# Patient Record
Sex: FEMALE | Race: WHITE | NOT HISPANIC OR LATINO | Employment: OTHER | URBAN - METROPOLITAN AREA
[De-identification: names, ages, dates, MRNs, and addresses within clinical notes are randomized per-mention and may not be internally consistent; named-entity substitution may affect disease eponyms.]

---

## 2024-04-18 ASSESSMENT — DERMATOLOGY QUALITY OF LIFE (QOL) ASSESSMENT
RATE HOW EMOTIONALLY BOTHERED YOU ARE BY YOUR SKIN PROBLEM (FOR EXAMPLE, WORRY, EMBARRASSMENT, FRUSTRATION): 3
RATE HOW BOTHERED YOU ARE BY EFFECTS OF YOUR SKIN PROBLEMS ON YOUR ACTIVITIES (EG, GOING OUT, ACCOMPLISHING WHAT YOU WANT, WORK ACTIVITIES OR YOUR RELATIONSHIPS WITH OTHERS): 3
WHAT SINGLE SKIN CONDITION LISTED BELOW IS THE PATIENT ANSWERING THE QUALITY-OF-LIFE ASSESSMENT QUESTIONS ABOUT: DERMATITIS
WHAT SINGLE SKIN CONDITION LISTED BELOW IS THE PATIENT ANSWERING THE QUALITY-OF-LIFE ASSESSMENT QUESTIONS ABOUT: DERMATITIS
RATE HOW BOTHERED YOU ARE BY EFFECTS OF YOUR SKIN PROBLEMS ON YOUR ACTIVITIES (EG, GOING OUT, ACCOMPLISHING WHAT YOU WANT, WORK ACTIVITIES OR YOUR RELATIONSHIPS WITH OTHERS): 3
RATE HOW EMOTIONALLY BOTHERED YOU ARE BY YOUR SKIN PROBLEM (FOR EXAMPLE, WORRY, EMBARRASSMENT, FRUSTRATION): 3
RATE HOW BOTHERED YOU ARE BY SYMPTOMS OF YOUR SKIN PROBLEM (EG, ITCHING, STINGING BURNING, HURTING OR SKIN IRRITATION): 5
RATE HOW BOTHERED YOU ARE BY SYMPTOMS OF YOUR SKIN PROBLEM (EG, ITCHING, STINGING BURNING, HURTING OR SKIN IRRITATION): 5

## 2024-04-18 NOTE — PROGRESS NOTES
"Subjective     Britt Santizo is a 68 y.o. female who presents for the following: Rash.  She states she initially developed itching on the back of her neck 6 weeks ago.  She reports she then noticed red, raised, itchy areas on the sides of her neck and her upper chest.  She denies any other areas of involvement currently.  Of note, she reports she got her hair dyed approximately 2 months ago, but she is not sure whether it was a new dye or product or not.  She denies any other new, changing, or concerning skin lesions; no bleeding, itching, or burning lesions.      Review of Systems:  No other skin or systemic complaints other than what is documented elsewhere in the note.    The following portions of the chart were reviewed this encounter and updated as appropriate:       Skin Cancer History  No skin cancer on file.    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 Miguel Mcmillan on 8/17/05 s/p Mohs surgery by Dr. Nina Ly    Family History:    Parent - nonmelanoma skin cancer  No family history of melanoma    Social History:    The patient states she lives in [place] with farms around her house    Allergies:  Codeine, Epinephrine, and Procaine    Current Medications / CAM's:    Current Outpatient Medications:     triamcinolone (Kenalog) 0.025 % cream, Apply twice daily to affected areas of neck (avoid face, groin, body folds) for 2 weeks, Disp: 80 g, Rfl: 0     Objective   Well appearing patient in no apparent distress; mood and affect are within normal limits.    A skin examination was performed including: Face, neck, chest, and scalp.  She declined examination of any other parts of her skin today. All findings within normal limits unless otherwise noted below.    Assessment/Plan   1. Rash and other nonspecific skin eruption  Left Superior " Lateral Neck  Several pink to erythematous, slightly scaly papules and plaques on her left and right lateral neck and upper chest          Pink to erythematous, scaly papules and plaques - neck and chest.  The clinical differential diagnosis for these lesions includes possibly contact dermatitis, possibly allergic contact dermatitis, versus PMLE versus Dermatomyositis versus other connective tissue disease.  The nature of each of these conditions and management options, including possible biopsy for further diagnostic evaluation, were discussed extensively with the patient today.  After discussing the risks and benefits of various management options, the patient expressed understanding and wishes to undergo biopsy today.  Thus, at this time, I recommend punch biopsy of a representative lesion on the patient's left superior lateral neck in the office today for further diagnostic evaluation.  In the meantime, while we await biopsy results, I recommend empiric topical steroid therapy with Triamcinolone 0.025% cream, which the patient was instructed to apply twice daily to the affected areas of the chest and neck (avoid face, groin, body folds) for the next 2 weeks.  The risks, benefits, and side effects of this medication, including possible skin atrophy with its overuse, were discussed.  The patient expressed understanding, is in agreement with this plan, and wishes to proceed with the biopsy today.    Skin biopsy - Left Superior Lateral Neck  Type of biopsy: punch    Informed consent: discussed and consent obtained    Timeout: patient name, date of birth, surgical site, and procedure verified    Procedure prep:  Patient was prepped and draped  Anesthesia: the lesion was anesthetized in a standard fashion    Anesthetic:  1% lidocaine plain local infiltration  Punch size:  3 mm  Suture size:  5-0  Suture type: Prolene (polypropylene)    Suture removal (days):  7  Hemostasis achieved with: suture    Outcome: patient  tolerated procedure well    Post-procedure details: sterile dressing applied and wound care instructions given    Dressing type: bandage and petrolatum      Staff Communication: Dermatology Local Anesthesia: 1 % Lidocaine / Epinephrine - Amount:0.5ml - Left Superior Lateral Neck    triamcinolone (Kenalog) 0.025 % cream - Left Superior Lateral Neck  Apply twice daily to affected areas of neck (avoid face, groin, body folds) for 2 weeks    Specimen 1 - Dermatopathology- DERM LAB  Differential Diagnosis: contact dermatitis v PMLE v DM v other CT disease  Check Margins Yes/No?:    Comments:    Dermpath Lab: Routine Histopathology (formalin-fixed tissue)    2. Actinic keratosis (8)  Mid Supratip of Nose (8)  Scattered on the patient's nose and bilateral cheeks, there are 8 erythematous, gritty, scaly macules     Actinic Keratoses -scattered on nose and bilateral cheeks.  The pre-cancerous nature of these lesions and treatment options were discussed with the patient today.  At this time, I recommend treatment with liquid nitrogen cryotherapy.  The patient expressed understanding, is in agreement with this plan, and wishes to proceed with cryotherapy today.    Destr of lesion - Mid Supratip of Nose  Complexity: simple    Destruction method: cryotherapy    Informed consent: discussed and consent obtained    Lesion destroyed using liquid nitrogen: Yes    Cryotherapy cycles:  1  Outcome: patient tolerated procedure well with no complications    Post-procedure details: wound care instructions given      3. Seborrheic keratosis  Scattered on the patient's face, neck, and chest, there are several tan- to light brown-colored, hyperkeratotic, stuck-on appearing papules of varying size and shape    Seborrheic Keratoses - the benign nature of these lesions was discussed with the patient today and reassurance provided.  No treatment is medically indicated for these lesions at this time.    4. Diffuse photodamage of  skin  Photodistributed  Diffuse photodamage with actinic changes with telangiectasia and mottled pigmentation in sun-exposed areas.    History of melanoma, basal cell carcinoma, and actinic keratoses and photodamage.  The patient was last seen in our office for routine melanoma follow-up and skin exam by Dr. Hansen on 8/10/23, at which time no evidence of recurrence was noted, and she states she already scheduled an annual return visit for routine follow-up and skin exam with Dr. Hansen on 8/15/24.  I emphasized the importance of continuing to perform monthly self-skin and lymph node exams using the ABCDs of monitoring moles, which were reviewed with the patient, as well as the importance of sun avoidance and sun protection with daily sunscreen use.  I recommend she return to our office for routine melanoma follow-up and total body skin exam with Dr. Hansen on 8/15/24 as scheduled, pending the above biopsy result, and the patient was instructed to call our office should the patient notice any new, changing, symptomatic, or otherwise concerning skin lesions before then.  The patient expressed understanding and is in agreement with this plan.

## 2024-04-19 ENCOUNTER — OFFICE VISIT (OUTPATIENT)
Dept: DERMATOLOGY | Facility: CLINIC | Age: 69
End: 2024-04-19
Payer: MEDICARE

## 2024-04-19 ENCOUNTER — TELEPHONE (OUTPATIENT)
Dept: DERMATOLOGY | Facility: CLINIC | Age: 69
End: 2024-04-19

## 2024-04-19 DIAGNOSIS — L82.1 SEBORRHEIC KERATOSIS: ICD-10-CM

## 2024-04-19 DIAGNOSIS — L57.0 ACTINIC KERATOSIS: ICD-10-CM

## 2024-04-19 DIAGNOSIS — L57.8 DIFFUSE PHOTODAMAGE OF SKIN: ICD-10-CM

## 2024-04-19 DIAGNOSIS — R21 RASH AND OTHER NONSPECIFIC SKIN ERUPTION: Primary | ICD-10-CM

## 2024-04-19 PROCEDURE — 11104 PUNCH BX SKIN SINGLE LESION: CPT | Performed by: DERMATOLOGY

## 2024-04-19 PROCEDURE — 17003 DESTRUCT PREMALG LES 2-14: CPT | Performed by: DERMATOLOGY

## 2024-04-19 PROCEDURE — 88305 TISSUE EXAM BY PATHOLOGIST: CPT | Performed by: DERMATOLOGY

## 2024-04-19 PROCEDURE — 88342 IMHCHEM/IMCYTCHM 1ST ANTB: CPT | Performed by: DERMATOLOGY

## 2024-04-19 PROCEDURE — 1159F MED LIST DOCD IN RCRD: CPT | Performed by: DERMATOLOGY

## 2024-04-19 PROCEDURE — 99214 OFFICE O/P EST MOD 30 MIN: CPT | Performed by: DERMATOLOGY

## 2024-04-19 PROCEDURE — 88341 IMHCHEM/IMCYTCHM EA ADD ANTB: CPT | Performed by: DERMATOLOGY

## 2024-04-19 PROCEDURE — 17000 DESTRUCT PREMALG LESION: CPT | Performed by: DERMATOLOGY

## 2024-04-19 RX ORDER — TRIAMCINOLONE ACETONIDE 0.25 MG/G
CREAM TOPICAL
Qty: 80 G | Refills: 0 | Status: SHIPPED | OUTPATIENT
Start: 2024-04-19

## 2024-04-19 ASSESSMENT — DERMATOLOGY PATIENT ASSESSMENT
DO YOU USE SUNSCREEN: OCCASIONALLY
ARE YOU TRYING TO GET PREGNANT: NO
DO YOU HAVE ANY NEW OR CHANGING LESIONS: NO
DO YOU USE A TANNING BED: NO
ARE YOU AN ORGAN TRANSPLANT RECIPIENT: NO
DO YOU HAVE IRREGULAR MENSTRUAL CYCLES: NO
ARE YOU ON BIRTH CONTROL: NO

## 2024-04-19 ASSESSMENT — ITCH NUMERIC RATING SCALE: HOW SEVERE IS YOUR ITCHING?: 6

## 2024-04-19 ASSESSMENT — DERMATOLOGY QUALITY OF LIFE (QOL) ASSESSMENT: ARE THERE EXCLUSIONS OR EXCEPTIONS FOR THE QUALITY OF LIFE ASSESSMENT: NO

## 2024-04-19 NOTE — TELEPHONE ENCOUNTER
Pt left message at 7:56 that she had 6 cows in her driveway , couldn't get out until cows moved she will be late for her 8:00am appt .

## 2024-04-26 ENCOUNTER — OFFICE VISIT (OUTPATIENT)
Dept: DERMATOLOGY | Facility: CLINIC | Age: 69
End: 2024-04-26
Payer: MEDICARE

## 2024-04-26 DIAGNOSIS — L81.4 LENTIGO: ICD-10-CM

## 2024-04-26 DIAGNOSIS — L30.9 HAND ECZEMA: Primary | ICD-10-CM

## 2024-04-26 DIAGNOSIS — R21 RASH AND OTHER NONSPECIFIC SKIN ERUPTION: ICD-10-CM

## 2024-04-26 DIAGNOSIS — L82.1 SEBORRHEIC KERATOSIS: ICD-10-CM

## 2024-04-26 DIAGNOSIS — L57.8 DIFFUSE PHOTODAMAGE OF SKIN: ICD-10-CM

## 2024-04-26 LAB
LAB AP ASR DISCLAIMER: NORMAL
LABORATORY COMMENT REPORT: NORMAL
PATH REPORT.FINAL DX SPEC: NORMAL
PATH REPORT.GROSS SPEC: NORMAL
PATH REPORT.RELEVANT HX SPEC: NORMAL
PATH REPORT.TOTAL CANCER: NORMAL

## 2024-04-26 PROCEDURE — 1159F MED LIST DOCD IN RCRD: CPT | Performed by: DERMATOLOGY

## 2024-04-26 PROCEDURE — 99214 OFFICE O/P EST MOD 30 MIN: CPT | Performed by: DERMATOLOGY

## 2024-04-26 RX ORDER — CYCLOBENZAPRINE HCL 5 MG
5 TABLET ORAL EVERY 8 HOURS PRN
COMMUNITY
Start: 2023-11-20

## 2024-04-26 RX ORDER — HYDROXYCHLOROQUINE SULFATE 200 MG/1
TABLET, FILM COATED ORAL
COMMUNITY

## 2024-04-26 RX ORDER — MELOXICAM 15 MG/1
15 TABLET ORAL DAILY
COMMUNITY
Start: 2023-12-18

## 2024-04-28 NOTE — PROGRESS NOTES
"Subjective     Britt Santizo is a 68 y.o. female who presents for the following: Follow-up.  She states she initially developed itching on the back of her neck 7 weeks ago.  She reports she then noticed red, raised, itchy areas on the sides of her neck and her upper chest.  Of note, she reports she got her hair dyed approximately 2 months ago, but she is not sure whether it was a new dye or product or not.    She was last seen in our office on 4/19/24, at which time punch biopsy of a representative lesion on her left superior lateral neck was performed, but the results are still pending, and she was prescribed Triamcinolone 0.025% cream, which she has used twice daily on her face and neck for the past 1 week with some improvement.  However, she states she has developed new areas in her hairline on both sides as well as on the tops of her hands, and these areas are red, scaly, and very itchy.  She also notes a bump on her left upper cheek, which has been present for several months, but has recently become more flaky and itchy.  It has not changed in any other way, including in size, shape, or color, and it does not hurt or bleed.  She denies any other new, changing, or concerning skin lesions since her last visit; no bleeding, itching, or burning lesions.      Review of Systems:  No other skin or systemic complaints other than what is documented elsewhere in the note.    The following portions of the chart were reviewed this encounter and updated as appropriate:       Skin Cancer History  No skin cancer on file.    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 Miguel Mcmillan on 8/17/05 s/p Mohs surgery by Dr. Nina Ly    Family History:    Parent - nonmelanoma skin cancer  No family history of melanoma    Social History:    The patient states she " lives in [place] with farms around her house    Allergies:  Codeine, Epinephrine, and Procaine    Current Medications / CAM's:    Current Outpatient Medications:     cyclobenzaprine (Flexeril) 5 mg tablet, Take 1 tablet (5 mg) by mouth every 8 hours if needed., Disp: , Rfl:     denosumab (Prolia) 60 mg/mL syringe, Inject 1 mL (60 mg total) under the skin every 6 months., Disp: , Rfl:     hydroxychloroquine (Plaquenil) 200 mg tablet, TAKE 1 TAB DAILY MONDAY-FRIDA AND TAKE 2 TABS DAILY SAT-SUN, Disp: , Rfl:     meloxicam (Mobic) 15 mg tablet, Take 1 tablet (15 mg) by mouth once daily., Disp: , Rfl:     triamcinolone (Kenalog) 0.025 % cream, Apply twice daily to affected areas of neck (avoid face, groin, body folds) for 2 weeks, Disp: 80 g, Rfl: 0     Objective   Well appearing patient in no apparent distress; mood and affect are within normal limits.    A skin examination was performed including: Face, neck, and bilateral upper extremities. All findings within normal limits unless otherwise noted below.    Assessment/Plan   1. Hand eczema  Left Hand - Anterior  On the patient's bilateral dorsal hands, there are several erythematous, scaly, slightly lichenified, thin papules and small plaques    Hand Dermatitis -bilateral dorsal hands.  The potentially chronic and intermittently flaring nature of this condition and treatment options were discussed extensively with the patient today.  At this time, I recommend topical steroid therapy with Triamcinolone 0.1% cream, which the patient was instructed to apply twice daily to the affected areas of her hands (avoid face, groin, body folds) for the next 2 weeks, followed by taper to twice daily on weekends only for persistent areas; the patient may repeat treatment in a 2-week burst-and-taper fashion every 6-8 weeks as needed for future flares.  I also emphasized the importance of dry, sensitive skin care and good hand hygiene, including minimizing the frequency and  duration of hand-washing as much as is safely possible, given the current coronavirus pandemic; using a lukewarm, but not hot water and a mild soap, such as Dove; and frequent and aggressive moisturization, at least twice daily and immediately following hand-washing, with recommended over-the-counter moisturizing creams, such as Eucerin, Cetaphil, Cerave, or Aveeno; Vaseline or Aquaphor ointments; or Neutrogena Norwegian Formula Hand Cream.  The risks, benefits, and side effects of this medication, including possible skin atrophy with overuse of topical steroids, were discussed.  The patient expressed understanding and is in agreement with this plan.    2. Rash and other nonspecific skin eruption  Neck - Anterior  Several pink to slightly erythematous, slightly scaly, thin papules and plaques on her left and right lateral neck and upper chest    Pink to erythematous, scaly papules and plaques - neck and chest with new lesions on left and right temporal hairline.  Based on the patient's history, recent exam, and repeat exam today, the clinical differential diagnosis for these lesions includes possibly contact dermatitis, possibly allergic contact dermatitis, versus PMLE versus Dermatomyositis versus other connective tissue disease.  The nature of each of these conditions and management options were discussed extensively with the patient today.  After discussing the risks and benefits of various management options, the patient expressed understanding and wished to undergo biopsy for further diagnostic evaluation.  Thus, punch biopsy of a representative lesion on the patient's left superior lateral neck was performed at her last visit on 4/19/24, but the results are still pending.  Thus, while we await biopsy results, I recommend she continue empiric topical steroid therapy with Triamcinolone 0.025% cream, which the patient was instructed to apply twice daily to the affected areas of the chest, neck, and bilateral  temporal hairline (avoid face, groin, body folds) for the next 1-2 weeks.  The risks, benefits, and side effects of this medication, including possible skin atrophy with its overuse, were discussed.  We will contact the patient with her biopsy result once it is available.  She expressed understanding and is in agreement with this plan.  Of note, her suture was removed in the office today as well.    Related Medications  triamcinolone (Kenalog) 0.025 % cream  Apply twice daily to affected areas of neck (avoid face, groin, body folds) for 2 weeks    3. Seborrheic keratosis  Head - Anterior (Face)  On her left medial upper cheek, there is a 7 mm tan to light brown-colored, hyperkeratotic, stuck on appearing papule.  Scattered on the patient's face, neck, and bilateral upper extremities, there are multiple tan- to light brown-colored, hyperkeratotic, stuck-on appearing papules of varying size and shape.    Seborrheic Keratoses - the benign nature of these lesions was discussed with the patient today and reassurance provided.  No treatment is medically indicated for these lesions at this time.    4. Lentigo  Photodistributed  Multiple tan- to light brown-colored, round- to oval-shaped, symmetric and uniform-appearing macules and small patches consistent with lentigines scattered in sun-exposed areas.    Solar Lentigines and photodamage.  The clinically benign-appearing nature of these lesions and their relation to chronic sun exposure were discussed with the patient today and reassurance provided.  None of these lesions meet threshold for biopsy today, and thus no treatment is medically indicated for these lesions at this time.  The signs and symptoms of skin cancer were reviewed and the patient was advised to practice sun protection and sun avoidance, use daily sunscreen, and perform regular self skin exams.  The patient was instructed to monitor these lesions for any changes, such as in size, shape, or color, or  associated symptoms and to call our office to schedule a return visit for re-evaluation if any such changes or symptoms are noticed in the future.  The patient expressed understanding and is in agreement with this plan.    5. Diffuse photodamage of skin  Photodistributed  Diffuse photodamage with actinic changes with telangiectasia and mottled pigmentation in sun-exposed areas.    History of melanoma, basal cell carcinoma, and actinic keratoses and photodamage.  The patient was last seen in our office for routine melanoma follow-up and skin exam by Dr. Hansen on 8/10/23, at which time no evidence of recurrence was noted, and she states she already scheduled an annual return visit for routine follow-up and skin exam with Dr. Hansen on 8/15/24.  I emphasized the importance of continuing to perform monthly self-skin and lymph node exams using the ABCDs of monitoring moles, which were reviewed with the patient, as well as the importance of sun avoidance and sun protection with daily sunscreen use.  I recommend she return to our office for routine melanoma follow-up and total body skin exam with Dr. Hansen on 8/15/24 as scheduled, pending the above biopsy result, and the patient was instructed to call our office should the patient notice any new, changing, symptomatic, or otherwise concerning skin lesions before then.  The patient expressed understanding and is in agreement with this plan.

## 2024-05-06 ENCOUNTER — TELEPHONE (OUTPATIENT)
Dept: DERMATOLOGY | Facility: CLINIC | Age: 69
End: 2024-05-06
Payer: MEDICARE

## 2024-05-28 ENCOUNTER — OFFICE VISIT (OUTPATIENT)
Dept: OBSTETRICS AND GYNECOLOGY | Facility: CLINIC | Age: 69
End: 2024-05-28
Payer: MEDICARE

## 2024-05-28 VITALS — WEIGHT: 119 LBS | SYSTOLIC BLOOD PRESSURE: 115 MMHG | DIASTOLIC BLOOD PRESSURE: 68 MMHG | BODY MASS INDEX: 21.08 KG/M2

## 2024-05-28 DIAGNOSIS — N76.2 ACUTE VULVITIS: Primary | ICD-10-CM

## 2024-05-28 DIAGNOSIS — N95.8 GENITOURINARY SYNDROME OF MENOPAUSE: ICD-10-CM

## 2024-05-28 PROCEDURE — 99214 OFFICE O/P EST MOD 30 MIN: CPT | Performed by: OBSTETRICS & GYNECOLOGY

## 2024-05-28 PROCEDURE — 1159F MED LIST DOCD IN RCRD: CPT | Performed by: OBSTETRICS & GYNECOLOGY

## 2024-05-28 PROCEDURE — 1036F TOBACCO NON-USER: CPT | Performed by: OBSTETRICS & GYNECOLOGY

## 2024-05-28 RX ORDER — ESTRADIOL 0.1 MG/G
0.5 CREAM VAGINAL 3 TIMES WEEKLY
Qty: 42.5 G | Refills: 3 | Status: SHIPPED | OUTPATIENT
Start: 2024-05-29 | End: 2025-05-29

## 2024-05-28 RX ORDER — CLOTRIMAZOLE AND BETAMETHASONE DIPROPIONATE 10; .64 MG/G; MG/G
1 CREAM TOPICAL 2 TIMES DAILY
Qty: 15 G | Refills: 2 | Status: SHIPPED | OUTPATIENT
Start: 2024-05-28 | End: 2024-06-25

## 2024-05-28 RX ORDER — FAMOTIDINE 20 MG/1
20 TABLET, FILM COATED ORAL NIGHTLY PRN
COMMUNITY

## 2024-05-28 RX ORDER — GLUCOSAMINE HCL 500 MG
TABLET ORAL
COMMUNITY

## 2024-05-28 NOTE — PROGRESS NOTES
"Britt Santizo is a 68 y.o. female who presents with a chief complaint of Vaginitis/Bacterial Vaginosis (Itching, burning and discomfort)  SUBJECTIVE  This is a 68-year-old female comes today for a problem visit.  She has vulvar and groin area itching, burning, discomfort about 1-1/2 weeks.  She felt there was perirectal and inguinal itching.  She purchased a \"jock itch\" remedy over-the-counter.  She feels it did not resolve her symptoms.  She thought it was so sensitive at the labia that it \"burns to urinate\".  There are no lesions.  No postmenopausal bleeding or discharge.  No fevers or chills, no change in bowel habits.    She is on Prolia with Dr. Manzo at [facility] and received her dose today.    She has a history of left breast DCIS.    She was recently on oral steroids.  She denies recent use of antibiotics.    Past Medical History:   Diagnosis Date    Melanoma (Multi)      Past Surgical History:   Procedure Laterality Date    MASTECTOMY Left      Social History     Socioeconomic History    Marital status:      Spouse name: None    Number of children: None    Years of education: None    Highest education level: None   Occupational History    None   Tobacco Use    Smoking status: Never    Smokeless tobacco: Never   Substance and Sexual Activity    Alcohol use: Not Currently    Drug use: Never    Sexual activity: Defer   Other Topics Concern    None   Social History Narrative    None     Social Determinants of Health     Financial Resource Strain: Low Risk  (10/9/2023)    Received from [facility]    Overall Financial Resource Strain (CARDIA)     Difficulty of Paying Living Expenses: Not hard at all   Food Insecurity: No Food Insecurity (10/9/2023)    Received from [facility]    Hunger Vital Sign     Worried About Running Out of Food in the Last Year: Never true     Ran Out of Food in the Last Year: Never true   Transportation Needs: No Transportation Needs (10/9/2023)    Received from [facility]    PRAPARE - Transportation     Lack of Transportation (Medical): No     Lack of Transportation (Non-Medical): No   Physical Activity: Patient Declined (10/9/2023)    Received from [facility]    Exercise Vital Sign     Days of Exercise per Week: Patient declined     Minutes of Exercise per Session: Patient declined   Stress: No Stress Concern Present (10/9/2023)    Received from [facility]    [employer] - Occupational Stress Questionnaire     Feeling of Stress : Not at all   Social Connections: Unknown (10/9/2023)    Received from [facility]    Social Connection and Isolation Panel [NHANES]     Frequency of Communication with Friends and Family: Three times a week     Frequency of Social Gatherings with Friends and Family: Twice a week     Attends Gnosticist Services: Patient declined     Active Member of Clubs or Organizations: Yes     Attends Club or Organization Meetings: Patient declined     Marital Status:    Intimate Partner Violence: Not on file   Housing Stability: Low Risk  (10/9/2023)    Received from [facility]    Housing Stability Vital Sign     Unable to Pay for Housing in the Last Year: No     Number of Places Lived in the Last Year: 1     Unstable Housing in the Last Year: No     Family History   Problem Relation Name Age of Onset    Breast cancer Mother      Lung cancer Mother      Accidental death Father         OB History    Para Term  AB Living   3 1           SAB IAB Ectopic Multiple Live Births                  # Outcome Date GA Lbr Sukhdev/2nd Weight Sex Delivery Anes PTL Lv   3             2             1 Para                OBJECTIVE  Allergies   Allergen Reactions    Codeine Itching    Epinephrine Other     rapid heart beat    Procaine Other     RAPID HEART BEAT      (Not in a hospital admission)       Review of Systems  Negative except vulvovaginitis.  Physical Exam  General Appearance: awake, alert,  oriented, in no acute distress  Constitutional: Alert and in no acute distress. Well developed, well nourished.   Head and Face: Head and face: Normal.    Eyes: Normal external exam - nonicteric sclera   Pulmonary: No respiratory distress.    Genitourinary: External genitalia: Normal.  Specifically, no ulcers, no rash, no redness, no obvious skin changes noted.  No inguinal lymphadenopathy. Bartholin's Urethral and Skenes Glands: Normal. Urethra: Normal.  Bladder: Normal on palpation. Vagina: Normal. Cervix: Normal.    Musculoskeletal:  normal movements of all extremities.   Psychiatric: Alert and oriented x 3. Affect normal to patient baseline. Mood: Appropriate.  /68   Wt 54 kg (119 lb)   BMI 21.08 kg/m²    Problem List Items Addressed This Visit    None  This is a 68-year-old female that has vulvovaginitis.  Specifically no rash was noted in the inguinal area but her areas of concern would be suspicious for yeast infection.  I recommend using Lotrisone topically on the vulva, perineal perirectal area.    I did also recommend beginning estradiol cream for the genitourinary syndrome of menopause.  She will begin a pea-sized amount at the vaginal opening every night for 2 weeks, then 3 times weekly thereafter.  It could take 8 to 12 weeks for the full effect of the estradiol cream.  I recommend calling if her symptoms are not improving.   I will see her as needed.

## 2024-08-13 ENCOUNTER — APPOINTMENT (OUTPATIENT)
Dept: DERMATOLOGY | Facility: CLINIC | Age: 69
End: 2024-08-13
Payer: MEDICARE

## 2024-08-13 DIAGNOSIS — L57.8 ACTINIC SKIN DAMAGE: ICD-10-CM

## 2024-08-13 DIAGNOSIS — Q81.9 EPIDERMOLYSIS BULLOSA (HHS-HCC): ICD-10-CM

## 2024-08-13 DIAGNOSIS — L82.1 SEBORRHEIC KERATOSIS: ICD-10-CM

## 2024-08-13 DIAGNOSIS — Z12.83 SCREENING EXAM FOR SKIN CANCER: ICD-10-CM

## 2024-08-13 DIAGNOSIS — D22.9 MULTIPLE BENIGN NEVI: Primary | ICD-10-CM

## 2024-08-13 DIAGNOSIS — L82.0 INFLAMED SEBORRHEIC KERATOSIS: ICD-10-CM

## 2024-08-13 DIAGNOSIS — Z85.820 PERSONAL HISTORY OF MALIGNANT MELANOMA OF SKIN: ICD-10-CM

## 2024-08-13 DIAGNOSIS — L60.9 NAIL DISORDER: ICD-10-CM

## 2024-08-13 DIAGNOSIS — L81.4 LENTIGO: ICD-10-CM

## 2024-08-13 PROBLEM — Z86.018 HISTORY OF DYSPLASTIC NEVUS: Status: ACTIVE | Noted: 2024-08-13

## 2024-08-13 PROBLEM — C44.311 BASAL CELL CARCINOMA OF NOSE: Status: ACTIVE | Noted: 2024-08-13

## 2024-08-13 PROBLEM — C43.59 MALIGNANT MELANOMA OF OTHER PART OF TRUNK (MULTI): Status: ACTIVE | Noted: 2024-08-13

## 2024-08-13 PROCEDURE — 1160F RVW MEDS BY RX/DR IN RCRD: CPT | Performed by: DERMATOLOGY

## 2024-08-13 PROCEDURE — 1036F TOBACCO NON-USER: CPT | Performed by: DERMATOLOGY

## 2024-08-13 PROCEDURE — 1159F MED LIST DOCD IN RCRD: CPT | Performed by: DERMATOLOGY

## 2024-08-13 PROCEDURE — 17110 DESTRUCTION B9 LES UP TO 14: CPT | Performed by: DERMATOLOGY

## 2024-08-13 PROCEDURE — 99213 OFFICE O/P EST LOW 20 MIN: CPT | Performed by: DERMATOLOGY

## 2024-08-13 NOTE — PROGRESS NOTES
Subjective     Britt Santizo is a 69 y.o. female who presents for the following: Skin Check.     Last derm visit 4/26/24 with Dr. Stoo for follow up of rash that had appeared suddenly in March. Biopsied 4/19/24 and demonstrated spong and interface dermatitis most consistent with drug eruption or viral exanthem although CTD could also be considered. The triamcinolone 0.025% cream did not help. Saw rheum who gave anti-histamine which did not help. Then rheum gave oral steroid with resolution    She is on plaquenil for rheumatoid arthritis and has been on it a few years    In retrospect, she wonders if COVID infection could have triggered, she did have it previously but not immediately before    She then developed a genital rash, unsure if it is same, saw gyne who gave her lotrisone cream which helped    Her last Full Skin Exam was 8/10/23 - actinic keratosis on nose treated with liquid nitrogen                 Review of Systems:  No other skin or systemic complaints other than what is documented elsewhere in the note.    The following portions of the chart were reviewed this encounter and updated as appropriate:  Tobacco  Allergies  Meds  Problems  Med Hx  Surg Hx         Skin Cancer History  No skin cancer on file.      Specialty Problems          Dermatology Problems    History of dysplastic nevus     She also has history of two severely dysplastic nevus re-esicsed from nose and right axilla.          Malignant melanoma of other part of trunk (Multi)     History of melanoma on right upper chest diagnosed 2005, it was superficial spreading with a breslow 0.56 mm s/p WLE 3/16/2005, clinical stage IA             Objective   Well appearing patient in no apparent distress; mood and affect are within normal limits.    A full examination was performed including scalp, head, eyes, ears, nose, lips, neck, chest, axillae, abdomen, back, buttocks, bilateral upper extremities, bilateral lower extremities, hands, feet,  fingers, toes, fingernails, and toenails. All findings within normal limits unless otherwise noted below.    Assessment/Plan   1. Multiple benign nevi  Brown and tan macules and papules with reassuring findings on dermoscopy    -These lesions have benign, reassuring patterns on dermoscopy  -Recommend continued self observation, and to contact the office if any changes in nevi are noticed    2. Lentigo  Tan macules    -Benign appearing on exam  -Reassurance, recommend observation    3. Seborrheic keratosis  Stuck on, waxy macule(s)/papule(s)/plaque(s) with comedo-like openings and milia like cysts    -Discussed the nature of the diagnosis  -Reassurance, recommend continued observation    4. Actinic skin damage  Background of photodamage with hyper- and hypo-pigmented macules on the skin    5. Personal history of malignant melanoma of skin  Lymph node basins palpated in relevant regions without appreciable adenopathy; regions include the neck and/or supraclavicular and/or axillary and/or inguinal and/or popliteal skin.    Personal History of Malignant Melanoma  -Well healed scar(s) with no evidence of recurrence  -Discussed the need for regular full skin examinations in the office, and monthly self examinations at home  -Discussed the need for annual ophthalmology exams with dilation  -Discussed concerning lesions and when to return sooner if any changes noted in skin lesions. Patient verbalizes understanding.    6. Screening exam for skin cancer  As part of a routine Full Skin Exam, a genital examination with the presence of a chaperone was offered. The patient declined the exam and declined the chaperone.       Full body skin exam  -No lesions concerning for malignancy on the remainder the skin exam today   - The ugly duckling sign was discussed. Monitor for any skin lesions that are different in color, shape, or size than others on body  -Sun protection was discussed. Recommend SPF 30+, hats with brims, sun  protective clothing, and avoiding sun exposure between 10 AM and 2 PM whenever possible  -Recommend regular skin exams or sooner if new or changing lesions       7. Inflamed seborrheic keratosis (2)  Left Anterior Mandible, Right Buccal Cheek  Stuck-on, waxy macule(s)/papule(s)/plaque(s) with comedo-like openings and milia-like cysts with surrounding erythema and crusting    -Patient requests cryotherapy today for these clinically inflamed lesions  -Possible side effects of liquid nitrogen treatment reviewed including formation of blisters, crusting, tenderness, scar, and discoloration which may be permanent.    Destr of lesion - Left Anterior Mandible, Right Buccal Cheek  Complexity: simple    Destruction method: cryotherapy    Informed consent: discussed and consent obtained    Lesion destroyed using liquid nitrogen: Yes    Outcome: patient tolerated procedure well with no complications      8. 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

## 2025-01-17 ENCOUNTER — OFFICE VISIT (OUTPATIENT)
Facility: CLINIC | Age: 70
End: 2025-01-17
Payer: MEDICARE

## 2025-01-17 VITALS — BODY MASS INDEX: 21.43 KG/M2 | SYSTOLIC BLOOD PRESSURE: 128 MMHG | WEIGHT: 121 LBS | DIASTOLIC BLOOD PRESSURE: 65 MMHG

## 2025-01-17 DIAGNOSIS — L98.9 SKIN LESION, SUPERFICIAL: Primary | ICD-10-CM

## 2025-01-17 PROCEDURE — 1159F MED LIST DOCD IN RCRD: CPT | Performed by: OBSTETRICS & GYNECOLOGY

## 2025-01-17 PROCEDURE — 87529 HSV DNA AMP PROBE: CPT

## 2025-01-17 PROCEDURE — 99214 OFFICE O/P EST MOD 30 MIN: CPT | Performed by: OBSTETRICS & GYNECOLOGY

## 2025-01-17 RX ORDER — ACYCLOVIR 50 MG/G
1 OINTMENT TOPICAL
Qty: 5 G | Refills: 1 | Status: SHIPPED | OUTPATIENT
Start: 2025-01-17

## 2025-01-17 RX ORDER — VALACYCLOVIR HYDROCHLORIDE 500 MG/1
500 TABLET, FILM COATED ORAL 2 TIMES DAILY
Qty: 40 TABLET | Refills: 1 | Status: SHIPPED | OUTPATIENT
Start: 2025-01-17 | End: 2025-01-24

## 2025-01-17 NOTE — PROGRESS NOTES
"Britt Santizo is a 69 y.o. female who presents with a chief complaint of Rash (Open sore on buttock)  SUBJECTIVE  The patient called today regarding a left buttock lesion.    Review of the chart notes she was last seen here May 2024 for generalized vulvar itching that has been managed with Lotrisone.  She is also using estradiol cream.    She has a history of left breast DCIS and is current on her breast imaging.    She does mention a history of epidural bullosa-type of dermatitis.    She states about 2 weeks ago she first felt \"tired\", possibly flulike.  Then a lesion erupted on the left inner buttock.  It has been sore and red like a blister for 1 week, it has been moist but no bleeding.  No vaginal bleeding.  No dysuria or change in bowel habits.  No change in partner.  No previous symptoms similar to this.    Past Medical History:   Diagnosis Date    Melanoma (Multi)      Past Surgical History:   Procedure Laterality Date    MASTECTOMY Left      Social History     Socioeconomic History    Marital status:    Tobacco Use    Smoking status: Never    Smokeless tobacco: Never   Substance and Sexual Activity    Alcohol use: Not Currently    Drug use: Never    Sexual activity: Defer     Social Drivers of Health     Financial Resource Strain: Low Risk  (11/5/2024)    Received from [facility]    Overall Financial Resource Strain (CARDIA)     Difficulty of Paying Living Expenses: Not hard at all   Food Insecurity: No Food Insecurity (11/5/2024)    Received from [facility]    Hunger Vital Sign     Worried About Running Out of Food in the Last Year: Never true     Ran Out of Food in the Last Year: Never true   Transportation Needs: No Transportation Needs (11/5/2024)    Received from [facility]    PRAPARE - Transportation     Lack of Transportation (Medical): No     Lack of Transportation (Non-Medical): No   Physical Activity: Sufficiently Active (11/5/2024)    Received from [facility]    " Exercise Vital Sign     Days of Exercise per Week: 5 days     Minutes of Exercise per Session: 30 min   Stress: No Stress Concern Present (2024)    Received from [facility]    [employer] - Occupational Stress Questionnaire     Feeling of Stress : Only a little   Social Connections: Moderately Integrated (2024)    Received from [facility]    Social Connection and Isolation Panel [NHANES]     Frequency of Communication with Friends and Family: Three times a week     Frequency of Social Gatherings with Friends and Family: Once a week     Attends Judaism Services: Never     Active Member of Clubs or Organizations: Yes     Attends Club or Organization Meetings: 1 to 4 times per year     Marital Status:    Housing Stability: Low Risk  (10/9/2023)    Received from [facility], [facility]    Housing Stability Vital Sign     Unable to Pay for Housing in the Last Year: No     Number of Places Lived in the Last Year: 1     Unstable Housing in the Last Year: No     Family History   Problem Relation Name Age of Onset    Breast cancer Mother      Lung cancer Mother      Accidental death Father         OB History    Para Term  AB Living   3 1           SAB IAB Ectopic Multiple Live Births                  # Outcome Date GA Lbr Sukhdev/2nd Weight Sex Type Anes PTL Lv   3             2             1 Para                OBJECTIVE  Allergies   Allergen Reactions    Codeine Itching    Epinephrine Other     rapid heart beat    Procaine Other     RAPID HEART BEAT      (Not in a hospital admission)       Review of Systems  Negative except blisterlike left buttock lesion.  Physical Exam  General Appearance: awake, alert, oriented, in no acute distress  The vulva and labia minora are normal.  With  the buttocks slightly there is an obvious lesion on the left inner buttocks about 1 cm from the rectum, measuring about 4 x 4 mm.  It is  slightly pink with very scant exudate.  HSV swab was collected.  With further observation there is a more healed area below this measuring about 3 x 3 mm not moist, it appears dry or healed.  /65   Wt 54.9 kg (121 lb)   BMI 21.43 kg/m²    Problem List Items Addressed This Visit    None  This is a 69-year-old female that has a lesion on the left buttocks that is suspicious for HSV.  A swab was sent.  We discussed keeping the area clean and dry and using topical Vaseline or Aquaphor.  I do recommend continuing her estradiol cream.    I ordered valacyclovir to take orally today for 5 days and also acyclovir ointment to apply topically.  If her symptoms are worsening such as cellulitis, fevers/chills then she will need to be evaluated in the emergency department.   Call me with concerns, follow-up routinely.

## 2025-01-19 LAB
HSV1 DNA SKIN QL NAA+PROBE: NOT DETECTED
HSV2 DNA SKIN QL NAA+PROBE: DETECTED

## 2025-01-19 NOTE — RESULT ENCOUNTER NOTE
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  This will be manageable with the Valtrex twice a day for 5 days, then once a day thereafter.  You may want to continue the Valtrex tablet daily until entirely healed.  Then you can discontinue and resume when you have symptoms.  Call me with any concerns.

## 2025-08-14 ENCOUNTER — APPOINTMENT (OUTPATIENT)
Dept: DERMATOLOGY | Facility: CLINIC | Age: 70
End: 2025-08-14
Payer: MEDICARE

## 2025-08-14 DIAGNOSIS — L57.0 ACTINIC KERATOSIS: ICD-10-CM

## 2025-08-14 DIAGNOSIS — Z85.820 PERSONAL HISTORY OF MALIGNANT MELANOMA OF SKIN: ICD-10-CM

## 2025-08-14 DIAGNOSIS — L81.4 LENTIGO: ICD-10-CM

## 2025-08-14 DIAGNOSIS — L82.1 SEBORRHEIC KERATOSIS: ICD-10-CM

## 2025-08-14 DIAGNOSIS — Z12.83 SCREENING EXAM FOR SKIN CANCER: ICD-10-CM

## 2025-08-14 DIAGNOSIS — L57.8 ACTINIC SKIN DAMAGE: ICD-10-CM

## 2025-08-14 DIAGNOSIS — L60.9 NAIL DISORDER: ICD-10-CM

## 2025-08-14 DIAGNOSIS — D22.9 MULTIPLE BENIGN NEVI: Primary | ICD-10-CM

## 2025-08-14 PROCEDURE — 99213 OFFICE O/P EST LOW 20 MIN: CPT | Performed by: DERMATOLOGY

## 2025-08-14 PROCEDURE — 17000 DESTRUCT PREMALG LESION: CPT | Performed by: DERMATOLOGY

## 2025-08-14 PROCEDURE — 1159F MED LIST DOCD IN RCRD: CPT | Performed by: DERMATOLOGY

## 2025-08-14 PROCEDURE — 1036F TOBACCO NON-USER: CPT | Performed by: DERMATOLOGY

## 2025-08-14 PROCEDURE — 1160F RVW MEDS BY RX/DR IN RCRD: CPT | Performed by: DERMATOLOGY

## 2025-08-14 ASSESSMENT — DERMATOLOGY QUALITY OF LIFE (QOL) ASSESSMENT
ARE THERE EXCLUSIONS OR EXCEPTIONS FOR THE QUALITY OF LIFE ASSESSMENT: NO
DATE THE QUALITY-OF-LIFE ASSESSMENT WAS COMPLETED: 67431
RATE HOW BOTHERED YOU ARE BY SYMPTOMS OF YOUR SKIN PROBLEM (EG, ITCHING, STINGING BURNING, HURTING OR SKIN IRRITATION): 0 - NEVER BOTHERED
RATE HOW EMOTIONALLY BOTHERED YOU ARE BY YOUR SKIN PROBLEM (FOR EXAMPLE, WORRY, EMBARRASSMENT, FRUSTRATION): 0 - NEVER BOTHERED
RATE HOW BOTHERED YOU ARE BY EFFECTS OF YOUR SKIN PROBLEMS ON YOUR ACTIVITIES (EG, GOING OUT, ACCOMPLISHING WHAT YOU WANT, WORK ACTIVITIES OR YOUR RELATIONSHIPS WITH OTHERS): 0 - NEVER BOTHERED

## 2025-08-14 ASSESSMENT — DERMATOLOGY PATIENT ASSESSMENT: DO YOU HAVE ANY NEW OR CHANGING LESIONS: NO

## 2025-08-14 ASSESSMENT — PATIENT GLOBAL ASSESSMENT (PGA): PATIENT GLOBAL ASSESSMENT: PATIENT GLOBAL ASSESSMENT:  1 - CLEAR

## 2025-08-14 ASSESSMENT — ITCH NUMERIC RATING SCALE: HOW SEVERE IS YOUR ITCHING?: 0

## 2025-08-18 ENCOUNTER — APPOINTMENT (OUTPATIENT)
Dept: OBSTETRICS AND GYNECOLOGY | Facility: CLINIC | Age: 70
End: 2025-08-18
Payer: MEDICARE

## 2025-08-18 VITALS
HEIGHT: 63 IN | HEART RATE: 68 BPM | DIASTOLIC BLOOD PRESSURE: 73 MMHG | BODY MASS INDEX: 21.26 KG/M2 | SYSTOLIC BLOOD PRESSURE: 116 MMHG | WEIGHT: 120 LBS

## 2025-08-18 DIAGNOSIS — A60.9 HSV (HERPES SIMPLEX VIRUS) ANOGENITAL INFECTION: ICD-10-CM

## 2025-08-18 DIAGNOSIS — L98.9 SKIN LESION, SUPERFICIAL: ICD-10-CM

## 2025-08-18 DIAGNOSIS — Z01.411 ENCOUNTER FOR GYNECOLOGICAL EXAMINATION WITH ABNORMAL FINDING: Primary | ICD-10-CM

## 2025-08-18 PROCEDURE — 88175 CYTOPATH C/V AUTO FLUID REDO: CPT

## 2025-08-18 PROCEDURE — 1159F MED LIST DOCD IN RCRD: CPT | Performed by: OBSTETRICS & GYNECOLOGY

## 2025-08-18 PROCEDURE — 88141 CYTOPATH C/V INTERPRET: CPT | Performed by: PATHOLOGY

## 2025-08-18 PROCEDURE — G2211 COMPLEX E/M VISIT ADD ON: HCPCS | Performed by: OBSTETRICS & GYNECOLOGY

## 2025-08-18 PROCEDURE — 1036F TOBACCO NON-USER: CPT | Performed by: OBSTETRICS & GYNECOLOGY

## 2025-08-18 PROCEDURE — 3008F BODY MASS INDEX DOCD: CPT | Performed by: OBSTETRICS & GYNECOLOGY

## 2025-08-18 RX ORDER — ACYCLOVIR 50 MG/G
1 OINTMENT TOPICAL
Qty: 5 G | Refills: 1 | Status: SHIPPED | OUTPATIENT
Start: 2025-08-18

## 2025-08-18 ASSESSMENT — COLUMBIA-SUICIDE SEVERITY RATING SCALE - C-SSRS
2. HAVE YOU ACTUALLY HAD ANY THOUGHTS OF KILLING YOURSELF?: NO
1. IN THE PAST MONTH, HAVE YOU WISHED YOU WERE DEAD OR WISHED YOU COULD GO TO SLEEP AND NOT WAKE UP?: NO
6. HAVE YOU EVER DONE ANYTHING, STARTED TO DO ANYTHING, OR PREPARED TO DO ANYTHING TO END YOUR LIFE?: NO

## 2025-08-18 ASSESSMENT — ENCOUNTER SYMPTOMS
LOSS OF SENSATION IN FEET: 0
DEPRESSION: 0
OCCASIONAL FEELINGS OF UNSTEADINESS: 0

## 2025-08-18 ASSESSMENT — PATIENT HEALTH QUESTIONNAIRE - PHQ9
1. LITTLE INTEREST OR PLEASURE IN DOING THINGS: NOT AT ALL
SUM OF ALL RESPONSES TO PHQ9 QUESTIONS 1 AND 2: 0
2. FEELING DOWN, DEPRESSED OR HOPELESS: NOT AT ALL

## 2025-08-25 ENCOUNTER — RESULTS FOLLOW-UP (OUTPATIENT)
Facility: CLINIC | Age: 70
End: 2025-08-25
Payer: MEDICARE

## 2025-08-25 LAB
CYTOLOGY CMNT CVX/VAG CYTO-IMP: NORMAL
LAB AP HISTORY OF MALIGNANCY: NORMAL
LAB AP HPV GENOTYPE QUESTION: YES
LAB AP HPV HR: NORMAL
LABORATORY COMMENT REPORT: NORMAL
MENSTRUAL HX REPORTED: NORMAL
PATH REPORT.TOTAL CANCER: NORMAL

## 2025-08-28 ENCOUNTER — PROCEDURE VISIT (OUTPATIENT)
Facility: CLINIC | Age: 70
End: 2025-08-28
Payer: MEDICARE

## 2025-08-28 VITALS
HEART RATE: 76 BPM | SYSTOLIC BLOOD PRESSURE: 122 MMHG | WEIGHT: 119 LBS | DIASTOLIC BLOOD PRESSURE: 70 MMHG | BODY MASS INDEX: 21.08 KG/M2

## 2025-08-28 DIAGNOSIS — R87.611 ATYPICAL SQUAMOUS CELLS CANNOT EXCLUDE HIGH GRADE SQUAMOUS INTRAEPITHELIAL LESION ON CYTOLOGIC SMEAR OF CERVIX (ASC-H): Primary | ICD-10-CM

## 2025-08-28 PROCEDURE — 57454 BX/CURETT OF CERVIX W/SCOPE: CPT | Performed by: OBSTETRICS & GYNECOLOGY

## 2025-08-28 ASSESSMENT — DERMATOLOGY QUALITY OF LIFE (QOL) ASSESSMENT
RATE HOW BOTHERED YOU ARE BY SYMPTOMS OF YOUR SKIN PROBLEM (EG, ITCHING, STINGING BURNING, HURTING OR SKIN IRRITATION): 3
ARE THERE EXCLUSIONS OR EXCEPTIONS FOR THE QUALITY OF LIFE ASSESSMENT: NO
RATE HOW BOTHERED YOU ARE BY EFFECTS OF YOUR SKIN PROBLEMS ON YOUR ACTIVITIES (EG, GOING OUT, ACCOMPLISHING WHAT YOU WANT, WORK ACTIVITIES OR YOUR RELATIONSHIPS WITH OTHERS): 1
RATE HOW EMOTIONALLY BOTHERED YOU ARE BY YOUR SKIN PROBLEM (FOR EXAMPLE, WORRY, EMBARRASSMENT, FRUSTRATION): 1
RATE HOW BOTHERED YOU ARE BY EFFECTS OF YOUR SKIN PROBLEMS ON YOUR ACTIVITIES (EG, GOING OUT, ACCOMPLISHING WHAT YOU WANT, WORK ACTIVITIES OR YOUR RELATIONSHIPS WITH OTHERS): 1
RATE HOW BOTHERED YOU ARE BY SYMPTOMS OF YOUR SKIN PROBLEM (EG, ITCHING, STINGING BURNING, HURTING OR SKIN IRRITATION): 3
RATE HOW EMOTIONALLY BOTHERED YOU ARE BY YOUR SKIN PROBLEM (FOR EXAMPLE, WORRY, EMBARRASSMENT, FRUSTRATION): 1
DATE THE QUALITY-OF-LIFE ASSESSMENT WAS COMPLETED: 67446
WHAT SINGLE SKIN CONDITION LISTED BELOW IS THE PATIENT ANSWERING THE QUALITY-OF-LIFE ASSESSMENT QUESTIONS ABOUT: NONE OF THE ABOVE

## 2025-08-28 ASSESSMENT — PATIENT GLOBAL ASSESSMENT (PGA): PATIENT GLOBAL ASSESSMENT: PATIENT GLOBAL ASSESSMENT:  1 - CLEAR

## 2025-08-29 ENCOUNTER — OFFICE VISIT (OUTPATIENT)
Dept: DERMATOLOGY | Facility: CLINIC | Age: 70
End: 2025-08-29
Payer: MEDICARE

## 2025-08-29 DIAGNOSIS — L82.0 INFLAMED SEBORRHEIC KERATOSIS: Primary | ICD-10-CM

## 2025-08-29 PROCEDURE — 17110 DESTRUCTION B9 LES UP TO 14: CPT | Performed by: STUDENT IN AN ORGANIZED HEALTH CARE EDUCATION/TRAINING PROGRAM

## 2025-08-29 PROCEDURE — 1159F MED LIST DOCD IN RCRD: CPT | Performed by: STUDENT IN AN ORGANIZED HEALTH CARE EDUCATION/TRAINING PROGRAM

## 2025-09-04 LAB
LAB AP ASR DISCLAIMER: NORMAL
LABORATORY COMMENT REPORT: NORMAL
PATH REPORT.FINAL DX SPEC: NORMAL
PATH REPORT.GROSS SPEC: NORMAL
PATH REPORT.RELEVANT HX SPEC: NORMAL
PATH REPORT.TOTAL CANCER: NORMAL
RESIDENT REVIEW: NORMAL

## 2026-08-18 ENCOUNTER — APPOINTMENT (OUTPATIENT)
Dept: DERMATOLOGY | Facility: CLINIC | Age: 71
End: 2026-08-18
Payer: MEDICARE

## 2026-08-24 ENCOUNTER — APPOINTMENT (OUTPATIENT)
Facility: CLINIC | Age: 71
End: 2026-08-24
Payer: MEDICARE